# Patient Record
Sex: MALE | Race: WHITE | Employment: FULL TIME | URBAN - METROPOLITAN AREA
[De-identification: names, ages, dates, MRNs, and addresses within clinical notes are randomized per-mention and may not be internally consistent; named-entity substitution may affect disease eponyms.]

---

## 2021-06-01 ENCOUNTER — TELEPHONE (OUTPATIENT)
Dept: FAMILY MEDICINE CLINIC | Age: 51
End: 2021-06-01

## 2021-06-01 NOTE — TELEPHONE ENCOUNTER
Patient used to be established with you/ last seen in 2013. He is wondering if you'll accept him back into your practice, he just wants to get a physical. Or should I give him the number to Dr. Nik Ho?

## 2021-06-09 ENCOUNTER — TELEPHONE (OUTPATIENT)
Dept: FAMILY MEDICINE CLINIC | Age: 51
End: 2021-06-09

## 2021-06-09 NOTE — TELEPHONE ENCOUNTER
Patient was scheduled today for an appointment. He did not stay for the appointment bc he had several symptoms. He was asked to have a COVID test by provider in which was negative. He is now calling for a virtual appointment. This would be his new patient appointment and acute. After speaking to you, I scheduled appointment for this Friday at 4:00. Dr. Prem Vega would you like him to see PA tomorrow for acute, so he does not have to wait, then schedule separate appointment for new patient?

## 2021-06-10 ENCOUNTER — HOSPITAL ENCOUNTER (EMERGENCY)
Age: 51
Discharge: HOME | End: 2021-06-10
Payer: COMMERCIAL

## 2021-06-10 VITALS
TEMPERATURE: 98.7 F | DIASTOLIC BLOOD PRESSURE: 94 MMHG | SYSTOLIC BLOOD PRESSURE: 115 MMHG | RESPIRATION RATE: 20 BRPM | OXYGEN SATURATION: 100 % | BODY MASS INDEX: 26.31 KG/M2 | WEIGHT: 205 LBS | HEART RATE: 86 BPM | HEIGHT: 74 IN

## 2021-06-10 DIAGNOSIS — R51.9 ACUTE NONINTRACTABLE HEADACHE, UNSPECIFIED HEADACHE TYPE: ICD-10-CM

## 2021-06-10 DIAGNOSIS — R52 BODY ACHES: ICD-10-CM
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
## 2021-06-10 PROCEDURE — 6370000000 HC RX 637 (ALT 250 FOR IP): Performed by: PHYSICIAN ASSISTANT

## 2021-06-10 PROCEDURE — 99284 EMERGENCY DEPT VISIT MOD MDM: CPT

## 2021-06-10 RX ORDER — DOXYCYCLINE HYCLATE 100 MG
100 TABLET ORAL ONCE
Status: COMPLETED | OUTPATIENT
Start: 2021-06-10 | End: 2021-06-10

## 2021-06-10 RX ORDER — DOXYCYCLINE HYCLATE 100 MG
100 TABLET ORAL 2 TIMES DAILY
Qty: 28 TABLET | Refills: 0 | Status: SHIPPED | OUTPATIENT
Start: 2021-06-10 | End: 2021-06-24

## 2021-06-10 RX ADMIN — DOXYCYCLINE HYCLATE 100 MG: 100 TABLET, COATED ORAL at 14:59

## 2021-06-10 ASSESSMENT — PAIN SCALES - GENERAL
PAINLEVEL_OUTOF10: 5
PAINLEVEL_OUTOF10: 5
PAINLEVEL_OUTOF10: 6
PAINLEVEL_OUTOF10: 4

## 2021-06-10 ASSESSMENT — PAIN DESCRIPTION - LOCATION
LOCATION: HEAD;NECK
LOCATION: HEAD
LOCATION: HEAD;GENERALIZED
LOCATION: NECK;HEAD

## 2021-06-10 ASSESSMENT — PAIN DESCRIPTION - DESCRIPTORS: DESCRIPTORS: HEADACHE

## 2021-06-10 ASSESSMENT — ENCOUNTER SYMPTOMS
COUGH: 0
VOMITING: 0
NAUSEA: 0
ABDOMINAL PAIN: 0
BACK PAIN: 0
SHORTNESS OF BREATH: 0
EYES NEGATIVE: 1

## 2021-06-10 ASSESSMENT — PAIN DESCRIPTION - PAIN TYPE: TYPE: ACUTE PAIN

## 2021-06-10 NOTE — TELEPHONE ENCOUNTER
I spoke with the pt. He states that he went to [facility] on [facility] He states that he was running a fever of 102 last night. He was talking to a friend that had Lyme Disease and that person told him that he might have it as well. Pt states that he is going to go back to this urgent care to be tested for Lyme Disease today. I asked him if he wanted to cancel his appt for tomorrow and he stated to just leave it and he will call in the morning if he wants to cancel or not.

## 2021-06-10 NOTE — ED PROVIDER NOTES
axilla:            DIAGNOSTICRESULTS:     Labs:  Lyme disease acute reflexive panel ordered per recommendation by infectious disease      PROCEDURES:   N/A    CRITICAL CARE TIME:       None    CONSULTS:  IP CONSULT TO INFECTIOUS DISEASES      EMERGENCY DEPARTMENT COURSE and DIFFERENTIAL DIAGNOSIS/MDM:   Vitals:    Vitals:    06/10/21 1300 06/10/21 1334 06/10/21 1440 06/10/21 1600   BP: 138/73 123/85 114/80 (!) 115/94   Pulse:  90 84 86   Resp:  20 20 20   Temp:       TempSrc:       SpO2:  99% 100% 100%   Weight:       Height:           Patient was given the following medications:  Medications   doxycycline hyclate (VIBRA-TABS) tablet 100 mg (100 mg Oral Given 6/10/21 1459)       I have evaluated this patient in the ED. Old records were reviewed. Patient arrives to the ED with a rash to his left forearm concerned about possible Lyme disease. He has a history that is concerning for this given his time out in the woods, working outside and history of finding ticks on himself in the past.  His rash is not a typical bull's-eye rash. However describes symptoms that would be consistent with Lyme disease. I did consult infectious disease just to get recommendations for any testing they would not need to do an antibiotic course/length. Dr. Yael Wiseman did recommend a Lyme disease acute reflexive panel despite the fact that results could be initially negative as it is early in the course of the suspected infection/illness. He also recommended the doxycycline course to be 14 days. Patient started on this. He is given return precautions and instructions on discharge. I estimate there is LOW risk for SEPSIS, STREP, EPIGLOTTITIS, PNEUMONIA, INFLUENZA, MENINGITIS, OR URINARY TRACT INFECTION, thus I consider the discharge disposition reasonable. Also, there is no evidence or peritonitis, sepsis, or toxicity.  Tamara Robison and I have discussed the diagnosis and risks, and we agree with discharging home to follow-up with their primary doctor. We also discussed returning to the Emergency Department immediately if new or worsening symptoms occur. We have discussed the symptoms which are most concerning (e.g., changing or worsening pain, trouble swallowing or breathing, neck stiffness, worsening fever) that necessitate immediate return. FINAL IMPRESSION:      1. Tick bite, initial encounter    2. Body aches    3.  Acute nonintractable headache, unspecified headache type          DISPOSITION/PLAN:   DISPOSITION Decision To Discharge      PATIENT REFERRED TO:  Yvrose Julien MD  [address] [facility] [address]  [phone]    Schedule an appointment as soon as possible for a visit       Saundra Traylor MD  [address] [address]  [phone]    Schedule an appointment as soon as possible for a visit         DISCHARGE MEDICATIONS:  Discharge Medication List as of 6/10/2021  2:55 PM      START taking these medications    Details   doxycycline hyclate (VIBRA-TABS) 100 MG tablet Take 1 tablet by mouth 2 times daily for 14 days, Disp-28 tablet, R-0Normal                        (Please note thatportions of this note were completed with a voice recognition program.  Efforts were made to edit the dictations, but occasionally words are mis-transcribed.)    Isai Trejo PA-C (electronicallysigned)              Attila Mosquera  06/10/21 0025

## 2021-06-10 NOTE — TELEPHONE ENCOUNTER
I called [facility] and spoke with Reyna Patiño. She states that he was there yesterday and did the rapid test. She is going to fax us the results to the nurse's station fax number so that I can scan the results as soon as I get them.

## 2021-06-10 NOTE — TELEPHONE ENCOUNTER
Dr. Elizabeth Blackwood, please see previous encounters. Things have changed a little. Please advise whether you want to keep as a DOXY/Virtual or in office. Need to confirm with patient when decided so he will know to receive a call or come into office.

## 2021-06-23 ENCOUNTER — TELEPHONE (OUTPATIENT)
Dept: INFECTIOUS DISEASES | Age: 51
End: 2021-06-23

## 2021-06-30 ENCOUNTER — HOSPITAL ENCOUNTER (OUTPATIENT)
Age: 51
Discharge: HOME | End: 2021-06-30
Payer: COMMERCIAL

## 2021-06-30 ENCOUNTER — OFFICE VISIT (OUTPATIENT)
Dept: FAMILY MEDICINE CLINIC | Age: 51
End: 2021-06-30
Payer: COMMERCIAL

## 2021-06-30 VITALS
OXYGEN SATURATION: 99 % | DIASTOLIC BLOOD PRESSURE: 60 MMHG | SYSTOLIC BLOOD PRESSURE: 90 MMHG | BODY MASS INDEX: 25.93 KG/M2 | WEIGHT: 202 LBS | HEART RATE: 77 BPM | HEIGHT: 74 IN
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
## 2021-06-30 DIAGNOSIS — Z00.00 ROUTINE GENERAL MEDICAL EXAMINATION AT A HEALTH CARE FACILITY: Primary | ICD-10-CM

## 2021-06-30 PROCEDURE — 99386 PREV VISIT NEW AGE 40-64: CPT | Performed by: FAMILY MEDICINE

## 2021-06-30 PROCEDURE — 86618 LYME DISEASE ANTIBODY: CPT

## 2021-06-30 PROCEDURE — 86617 LYME DISEASE ANTIBODY: CPT

## 2021-06-30 ASSESSMENT — PATIENT HEALTH QUESTIONNAIRE - PHQ9
1. LITTLE INTEREST OR PLEASURE IN DOING THINGS: 0
SUM OF ALL RESPONSES TO PHQ QUESTIONS 1-9: 0
SUM OF ALL RESPONSES TO PHQ9 QUESTIONS 1 & 2: 0
SUM OF ALL RESPONSES TO PHQ QUESTIONS 1-9: 0
2. FEELING DOWN, DEPRESSED OR HOPELESS: 0
SUM OF ALL RESPONSES TO PHQ QUESTIONS 1-9: 0
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
## 2021-07-02 LAB — LYME, EIA: 3.18 LIV (ref 0–1.2)

## 2021-07-05 LAB
LYME (B. BURGDORFERI) AB IGG WB: NEGATIVE
LYME AB IGM BY WB:: POSITIVE

## 2021-07-12 ENCOUNTER — NURSE ONLY (OUTPATIENT)
Dept: FAMILY MEDICINE CLINIC | Age: 51
End: 2021-07-12
Payer: COMMERCIAL

## 2021-07-12 DIAGNOSIS — Z00.00 ROUTINE GENERAL MEDICAL EXAMINATION AT A HEALTH CARE FACILITY: ICD-10-CM

## 2021-07-12 LAB
A/G RATIO: 1.6 (ref 1.1–2.2)
ALBUMIN SERPL-MCNC: 4.6 G/DL (ref 3.4–5)
ALP BLD-CCNC: 95 U/L (ref 40–129)
ALT SERPL-CCNC: 24 U/L (ref 10–40)
ANION GAP SERPL CALCULATED.3IONS-SCNC: 9 MMOL/L (ref 3–16)
AST SERPL-CCNC: 21 U/L (ref 15–37)
BILIRUB SERPL-MCNC: 0.5 MG/DL (ref 0–1)
BUN BLDV-MCNC: 14 MG/DL (ref 7–20)
CALCIUM SERPL-MCNC: 9.5 MG/DL (ref 8.3–10.6)
CHLORIDE BLD-SCNC: 108 MMOL/L (ref 99–110)
CHOLESTEROL, TOTAL: 169 MG/DL (ref 0–199)
CO2: 28 MMOL/L (ref 21–32)
CREAT SERPL-MCNC: 1.1 MG/DL (ref 0.9–1.3)
GFR AFRICAN AMERICAN: >60
GFR NON-AFRICAN AMERICAN: >60
GLOBULIN: 2.9 G/DL
GLUCOSE BLD-MCNC: 110 MG/DL (ref 70–99)
HDLC SERPL-MCNC: 45 MG/DL (ref 40–60)
HEPATITIS C ANTIBODY INTERPRETATION: NORMAL
LDL CHOLESTEROL CALCULATED: 104 MG/DL
POTASSIUM SERPL-SCNC: 5.2 MMOL/L (ref 3.5–5.1)
SODIUM BLD-SCNC: 145 MMOL/L (ref 136–145)
TOTAL PROTEIN: 7.5 G/DL (ref 6.4–8.2)
TRIGL SERPL-MCNC: 102 MG/DL (ref 0–150)
VLDLC SERPL CALC-MCNC: 20 MG/DL

## 2021-07-12 PROCEDURE — 36415 COLL VENOUS BLD VENIPUNCTURE: CPT | Performed by: FAMILY MEDICINE

## 2021-07-13 ENCOUNTER — TELEPHONE (OUTPATIENT)
Dept: INFECTIOUS DISEASES | Age: 51
End: 2021-07-13

## 2021-07-13 LAB
ESTIMATED AVERAGE GLUCOSE: 93.9 MG/DL
HBA1C MFR BLD: 4.9 %
HIV AG/AB: NORMAL
HIV ANTIGEN: NORMAL
HIV-1 ANTIBODY: NORMAL
HIV-2 AB: NORMAL

## 2021-07-13 NOTE — TELEPHONE ENCOUNTER
Looks like he has already been treated w 2 week course doxy  No need for ID evaluation unless there are ongoing concerns   Thanks

## 2021-07-13 NOTE — TELEPHONE ENCOUNTER
Patient tested positive for Lyme disease: Per dr. Cobb Sink note on 6/23/21:    Can we call and schedule patient sooner than in August ?

## 2021-07-13 NOTE — TELEPHONE ENCOUNTER
Patient advised, will call his PCP and let them know about Dr. Medina Quick advise.  No ongoing concerns per patient at [facility]

## 2021-08-13 ENCOUNTER — OFFICE VISIT (OUTPATIENT)
Dept: FAMILY MEDICINE CLINIC | Age: 51
End: 2021-08-13
Payer: COMMERCIAL

## 2021-08-13 VITALS
SYSTOLIC BLOOD PRESSURE: 118 MMHG | OXYGEN SATURATION: 97 % | BODY MASS INDEX: 26.15 KG/M2 | WEIGHT: 203.8 LBS | DIASTOLIC BLOOD PRESSURE: 76 MMHG | HEART RATE: 72 BPM | HEIGHT: 74 IN

## 2021-08-13 DIAGNOSIS — A69.20 LYME DISEASE: Primary | ICD-10-CM

## 2021-08-13 PROCEDURE — 99213 OFFICE O/P EST LOW 20 MIN: CPT | Performed by: PHYSICIAN ASSISTANT

## 2021-08-13 ASSESSMENT — ENCOUNTER SYMPTOMS: RESPIRATORY NEGATIVE: 1

## 2021-08-13 NOTE — PROGRESS NOTES
Subjective:      Patient ID: Sharona Burt is a 45 y.o. male. HPI  Patient presents with left arm pain for the last 4-6 weeks. Was bitten by a tic in June had fevers fatigue rash, headaches, was placed on doxy for 2 weeks, those symptoms resolved but then this started. The pain moves from the shoulder, elbow and right thumb joint. Review of Systems   Constitutional: Negative. Respiratory: Negative. Cardiovascular: Negative. Musculoskeletal:        Left shoulder, elbow and thumb pain       Objective:   Physical Exam  Constitutional:       Appearance: Normal appearance. Cardiovascular:      Rate and Rhythm: Normal rate and regular rhythm. Heart sounds: Normal heart sounds. Pulmonary:      Effort: Pulmonary effort is normal.      Breath sounds: Normal breath sounds. Musculoskeletal:      Left shoulder: Tenderness present. Decreased range of motion. Arms:       Comments: Full rom elbow and thumb joint, no ttp, no edema    Skin:     General: Skin is warm and dry. Neurological:      Mental Status: He is alert. Assessment / Plan:       Diagnosis Orders   1.  Lyme disease  Odette Fernandez MD, Infectious Disease, [facility]      Referral to ID for eval of migratory joint pains

## 2021-09-14 ENCOUNTER — OFFICE VISIT (OUTPATIENT)
Dept: INFECTIOUS DISEASES | Age: 51
End: 2021-09-14
Payer: COMMERCIAL

## 2021-09-14 VITALS
DIASTOLIC BLOOD PRESSURE: 82 MMHG | SYSTOLIC BLOOD PRESSURE: 116 MMHG | TEMPERATURE: 97.9 F | WEIGHT: 208 LBS | HEIGHT: 74 IN | BODY MASS INDEX: 26.69 KG/M2

## 2021-09-14 DIAGNOSIS — A69.23: Primary | ICD-10-CM

## 2021-09-14 PROCEDURE — 93000 ELECTROCARDIOGRAM COMPLETE: CPT | Performed by: INTERNAL MEDICINE

## 2021-09-14 PROCEDURE — 99243 OFF/OP CNSLTJ NEW/EST LOW 30: CPT | Performed by: INTERNAL MEDICINE

## 2021-09-14 RX ORDER — DOXYCYCLINE HYCLATE 100 MG
100 TABLET ORAL 2 TIMES DAILY
Qty: 56 TABLET | Refills: 0 | Status: SHIPPED | OUTPATIENT
Start: 2021-09-14 | End: 2021-10-12
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
## 2021-10-01 ENCOUNTER — TELEPHONE (OUTPATIENT)
Dept: INFECTIOUS DISEASES | Age: 51
End: 2021-10-01

## 2021-10-01 NOTE — TELEPHONE ENCOUNTER
Patient stated that he had received message from you regarding EKG and possibly seeing PCP. He is asking if you can return his phone call.   Thank you

## 2021-10-01 NOTE — TELEPHONE ENCOUNTER
Returned the call  He will speak with PCP to decide if repeat EKG vs Cards referral necessary   He continues to feel well/better

## 2021-10-13 ENCOUNTER — OFFICE VISIT (OUTPATIENT)
Dept: FAMILY MEDICINE CLINIC | Age: 51
End: 2021-10-13
Payer: COMMERCIAL

## 2021-10-13 VITALS
HEIGHT: 74 IN | OXYGEN SATURATION: 98 % | HEART RATE: 61 BPM | DIASTOLIC BLOOD PRESSURE: 72 MMHG | SYSTOLIC BLOOD PRESSURE: 100 MMHG | WEIGHT: 206.6 LBS | BODY MASS INDEX: 26.51 KG/M2

## 2021-10-13 DIAGNOSIS — R42 DIZZINESS: ICD-10-CM

## 2021-10-13 DIAGNOSIS — A69.20 LYME DISEASE: Primary | ICD-10-CM

## 2021-10-13 PROCEDURE — 99214 OFFICE O/P EST MOD 30 MIN: CPT | Performed by: FAMILY MEDICINE

## 2021-10-13 PROCEDURE — 93000 ELECTROCARDIOGRAM COMPLETE: CPT | Performed by: FAMILY MEDICINE

## 2021-10-13 NOTE — PROGRESS NOTES
Patient:  Mindy blanton 45 y.o. Angel Cuenca presents today with the following Chief Complaint(s):  Chief Complaint   Patient presents with    Abnormal Test Results     pt had an EKG ordered by Dr. Dennise Grajeda that was abnormal, she wanted pt to come here to see his PCP to see if he needed another EKG or if he should get a referral to cardiology       Patient is here today to follow-up on an abnormal EKG that he had while he was at Dr. Priscilla Cary office. He is being treated for Lyme's disease. He was describing some vague dizziness symptoms that only occurred when he is in big box stores or grocery stores. He has fleeting sensation of being off balance. Strongly active playing tennis and hiking. He denies any chest pain tightness pressure heaviness squeezing palpitations no syncope or near syncope. Did not have a prior EKG for comparison. His EKG at Dr. Priscilla Cary office showed sinus bradycardia with age-indeterminate anterior infarct. Patient had a recent physical, lipids were fine, no diabetes no hypertension no family history of heart disease. Non-smoker        No current outpatient medications on file. No current facility-administered medications for this visit. Patients past medical history, surgical history, family history, medications and allergies were all reviewed and updated as appropriate today. Review of Systems   Cardiovascular: Negative for chest pain and palpitations. Neurological: Negative for syncope, facial asymmetry and light-headedness. Physical Exam  Vitals reviewed. Cardiovascular:      Rate and Rhythm: Normal rate and regular rhythm. Heart sounds: Normal heart sounds. Pulmonary:      Breath sounds: Normal breath sounds. No wheezing or rales. Neurological:      Mental Status: He is alert.            Vitals:    10/13/21 1555   BP: 100/72   Pulse: 61   SpO2: 98%   Weight: 206 lb 9.6 oz (93.7 kg)   Height: 6' 2\" (1.88 m)       Assessment/Plan:   Jarrell Bolivar was seen today for abnormal test results. Diagnoses and all orders for this visit:    Lyme disease    Dizziness  -     EKG 12 Lead      Repeat EKG shows sinus bradycardia with a heart rate of 58, RS R pattern, unremarkable. Repeat EKG looking fine, no need for further cardiac testing. Patient was certainly involved in this decision making. He is very comfortable with this decision. If he has any future heart related symptoms he will let us know. He will continue to follow-up with Dr. Cloyde Gaucher regarding Lyme disease.     I spent a total of 30* minutes with the patient, reviewing previous notes, consults, test results, imaging ,discussing chronic and acute conditions

## 2021-10-28 ENCOUNTER — TELEPHONE (OUTPATIENT)
Dept: FAMILY MEDICINE CLINIC | Age: 51
End: 2021-10-28

## 2021-10-28 DIAGNOSIS — Z12.11 SCREEN FOR COLON CANCER: Primary | ICD-10-CM

## 2021-10-28 NOTE — TELEPHONE ENCOUNTER
Called patient, he said he called [facility] because that is closest to him. This is for a colonoscopy.

## 2021-10-28 NOTE — TELEPHONE ENCOUNTER
Aurelia Surya  Patient HM Schedule Request Pool 3 hours ago (12:33 PM)   MAIDA  Appointment Request From: Bipin Terrazas     With Provider: Santo Kennedy MD [-Primary Care Physician-]     Preferred Date Range: Any date 10/28/2021 or later     Preferred Times: Any     Reason: To address the following health maintenance concerns. Colon Cancer Screen Colonoscopy     Comments:  I tried to call a GI clinic directly.  They said they need a referral.

## 2023-04-26 ENCOUNTER — TELEMEDICINE (OUTPATIENT)
Dept: FAMILY MEDICINE CLINIC | Age: 53
End: 2023-04-26
Payer: COMMERCIAL

## 2023-04-26 DIAGNOSIS — H10.33 ACUTE CONJUNCTIVITIS OF BOTH EYES, UNSPECIFIED ACUTE CONJUNCTIVITIS TYPE: Primary | ICD-10-CM

## 2023-04-26 PROCEDURE — 99213 OFFICE O/P EST LOW 20 MIN: CPT | Performed by: PHYSICIAN ASSISTANT

## 2023-04-26 RX ORDER — MOXIFLOXACIN 5 MG/ML
1 SOLUTION/ DROPS OPHTHALMIC 3 TIMES DAILY
Qty: 1 EACH | Refills: 0 | Status: SHIPPED | OUTPATIENT
Start: 2023-04-26 | End: 2023-05-03

## 2023-04-26 RX ORDER — OLOPATADINE HYDROCHLORIDE 1 MG/ML
1 SOLUTION/ DROPS OPHTHALMIC 2 TIMES DAILY
Qty: 1 EACH | Refills: 3 | Status: SHIPPED | OUTPATIENT
Start: 2023-04-26 | End: 2023-05-26

## 2023-04-26 ASSESSMENT — PATIENT HEALTH QUESTIONNAIRE - PHQ9
SUM OF ALL RESPONSES TO PHQ9 QUESTIONS 1 & 2: 0
5. POOR APPETITE OR OVEREATING: 0
10. IF YOU CHECKED OFF ANY PROBLEMS, HOW DIFFICULT HAVE THESE PROBLEMS MADE IT FOR YOU TO DO YOUR WORK, TAKE CARE OF THINGS AT HOME, OR GET ALONG WITH OTHER PEOPLE: 0
2. FEELING DOWN, DEPRESSED OR HOPELESS: 0
3. TROUBLE FALLING OR STAYING ASLEEP: 0
4. FEELING TIRED OR HAVING LITTLE ENERGY: 0
6. FEELING BAD ABOUT YOURSELF - OR THAT YOU ARE A FAILURE OR HAVE LET YOURSELF OR YOUR FAMILY DOWN: 0
SUM OF ALL RESPONSES TO PHQ QUESTIONS 1-9: 0
SUM OF ALL RESPONSES TO PHQ QUESTIONS 1-9: 0
1. LITTLE INTEREST OR PLEASURE IN DOING THINGS: 0
7. TROUBLE CONCENTRATING ON THINGS, SUCH AS READING THE NEWSPAPER OR WATCHING TELEVISION: 0
9. THOUGHTS THAT YOU WOULD BE BETTER OFF DEAD, OR OF HURTING YOURSELF: 0
SUM OF ALL RESPONSES TO PHQ QUESTIONS 1-9: 0
SUM OF ALL RESPONSES TO PHQ QUESTIONS 1-9: 0
8. MOVING OR SPEAKING SO SLOWLY THAT OTHER PEOPLE COULD HAVE NOTICED. OR THE OPPOSITE, BEING SO FIGETY OR RESTLESS THAT YOU HAVE BEEN MOVING AROUND A LOT MORE THAN USUAL: 0

## 2023-04-26 NOTE — PROGRESS NOTES
Yes  Total time spent on this encounter: Not billed by time  Services were provided through a video synchronous discussion virtually to substitute for in-person clinic visit. Patient and provider were located at their individual homes. --BERTA Aquino on 4/26/2023 at 4:09 PM    An electronic signature was used to authenticate this note.

## 2024-02-03 NOTE — TELEPHONE ENCOUNTER
Called [facility] and spoke to Yolanda Howard  She states that this lab is not showing up on the \"softside \" which means it was   Never collected or the specimen was not sent / or lost
I think the test should have resulted by now  Can you please call the lab and see what is the status of the test
JIMENAM on patient's phone to call us back to advise
Noted     I think he should see his PCP and get a test for lyme if there was a significant exposure     If he tests positive or still having unexplained fever I would be happy to see him promptly     Please let him know
Patient was in ED on 6/10/21. On discharge document : To schedule as soon as possible with ID Doctor. Patient lives in [facility] area. Asking to make appointment with dr. Rohini Mcmillan   Lyme Disease Acute Reflexive Panel test done during the ED Visit  In person or VV visit, patient is ok with either.    Please advise
Time-based billing (NON-critical care)

## 2024-07-23 ENCOUNTER — OFFICE VISIT (OUTPATIENT)
Dept: FAMILY MEDICINE CLINIC | Age: 54
End: 2024-07-23
Payer: COMMERCIAL

## 2024-07-23 VITALS
BODY MASS INDEX: 23.9 KG/M2 | SYSTOLIC BLOOD PRESSURE: 122 MMHG | WEIGHT: 186.2 LBS | DIASTOLIC BLOOD PRESSURE: 64 MMHG | OXYGEN SATURATION: 98 % | HEIGHT: 74 IN | HEART RATE: 81 BPM

## 2024-07-23 DIAGNOSIS — Z86.19 HISTORY OF LYME DISEASE: ICD-10-CM

## 2024-07-23 DIAGNOSIS — M54.10 RADICULAR LEG PAIN: ICD-10-CM

## 2024-07-23 DIAGNOSIS — M25.521 ARTHRALGIA OF RIGHT ELBOW: Primary | ICD-10-CM

## 2024-07-23 PROCEDURE — 99214 OFFICE O/P EST MOD 30 MIN: CPT | Performed by: FAMILY MEDICINE

## 2024-07-23 NOTE — PROGRESS NOTES
Patient:  Silas Kramer a 54 y.o. malewho presents today with the following Chief Complaint(s):  Chief Complaint   Patient presents with    Back Pain     Pt states that he has been having worsening back pain. He states that he always feels a lower back pain that goes into his left leg, sometimes there is numbness. He think maybe there is a disc problem.    Circulatory Problem     Pt states that his left arm will fall asleep while bent for too long. He states that he has been having a lot of dizziness and almost passed out a few times. He states that he had lyme disease 3 years ago and is wanting to know if all of this could be from that.       Patient was last seen by me about 3 years ago.  At that time he had Lyme disease and was seen by infectious disease.  Patient returns today with multiple issues.  He says for about 20 years he has had occasions of radicular pain down his left leg.  He said that years ago he was told that he had a herniated disc.  This pain continues to happen intermittently.  He says it last for several hours and then it goes away.  It is not severe enough for him to take any medication.  When he had his Lyme disease infection, he had arthralgia of his left shoulder and elbow and wrist.  Now he is having pain in his right elbow that feels very similar to the pains that he had when he had active Lyme disease.  He denies any fall or trauma or injury to his right elbow he is right-hand dominant.  Again the pain is not severe enough for him to warrant taking any medication.  His main concern is if there is any underlying Lyme disease reactivation that could be causing this.  Patient also for his whole life is had issues with orthostasis.  When he is squatting down or sitting especially in the heat and stands up quickly he feels dizzy and lightheaded.  He denies any syncope he denies any chest pain palpitations irregular heartbeat.  Patient runs and cycles and does not have any similar pain

## 2024-07-26 ENCOUNTER — TELEPHONE (OUTPATIENT)
Dept: INFECTIOUS DISEASES | Age: 54
End: 2024-07-26

## 2024-07-26 NOTE — TELEPHONE ENCOUNTER
Called patient to schedule FOLLOW UP appointment with Dr. Reaves    [facility]

## 2024-07-26 NOTE — TELEPHONE ENCOUNTER
----- Message from Kiesha Gilbert RN sent at 7/26/2024 10:06 AM EDT -----  Regarding: RE: referral  Has seen Dr Reaves in 2021 for same dx. Please schedule him as a follow up.    Thanks!    ----- Message -----  From: Reyna Nichols MA  Sent: 7/24/2024   7:46 AM EDT  To: Kiesha Gilbert RN  Subject: referral                                         Received internal referral 7/24/24  Dx: Hx of Lyme Disease  Referred by: Dr. Cindy Xiong MD  Referral and Records in epic

## 2024-08-12 ENCOUNTER — OFFICE VISIT (OUTPATIENT)
Dept: FAMILY MEDICINE CLINIC | Age: 54
End: 2024-08-12
Payer: COMMERCIAL

## 2024-08-12 VITALS
WEIGHT: 182.2 LBS | HEIGHT: 74 IN | BODY MASS INDEX: 23.38 KG/M2 | HEART RATE: 65 BPM | DIASTOLIC BLOOD PRESSURE: 64 MMHG | OXYGEN SATURATION: 99 % | SYSTOLIC BLOOD PRESSURE: 110 MMHG

## 2024-08-12 DIAGNOSIS — Z00.00 ROUTINE GENERAL MEDICAL EXAMINATION AT A HEALTH CARE FACILITY: Primary | ICD-10-CM

## 2024-08-12 DIAGNOSIS — Z23 NEED FOR DIPHTHERIA-TETANUS-PERTUSSIS (TDAP) VACCINE: ICD-10-CM

## 2024-08-12 LAB
ALBUMIN SERPL-MCNC: 4.7 G/DL (ref 3.4–5)
ALBUMIN/GLOB SERPL: 1.8 {RATIO} (ref 1.1–2.2)
ALP SERPL-CCNC: 78 U/L (ref 40–129)
ALT SERPL-CCNC: 16 U/L (ref 10–40)
ANION GAP SERPL CALCULATED.3IONS-SCNC: 13 MMOL/L (ref 3–16)
AST SERPL-CCNC: 17 U/L (ref 15–37)
BASOPHILS # BLD: 0.1 K/UL (ref 0–0.2)
BASOPHILS NFR BLD: 1.6 %
BILIRUB SERPL-MCNC: 1.3 MG/DL (ref 0–1)
BUN SERPL-MCNC: 12 MG/DL (ref 7–20)
CALCIUM SERPL-MCNC: 9.6 MG/DL (ref 8.3–10.6)
CHLORIDE SERPL-SCNC: 104 MMOL/L (ref 99–110)
CHOLEST SERPL-MCNC: 161 MG/DL (ref 0–199)
CO2 SERPL-SCNC: 27 MMOL/L (ref 21–32)
CREAT SERPL-MCNC: 0.9 MG/DL (ref 0.9–1.3)
DEPRECATED RDW RBC AUTO: 12.8 % (ref 12.4–15.4)
EOSINOPHIL # BLD: 0.1 K/UL (ref 0–0.6)
EOSINOPHIL NFR BLD: 2.1 %
EST. AVERAGE GLUCOSE BLD GHB EST-MCNC: 85.3 MG/DL
GFR SERPLBLD CREATININE-BSD FMLA CKD-EPI: >90 ML/MIN/{1.73_M2}
GLUCOSE SERPL-MCNC: 88 MG/DL (ref 70–99)
HBA1C MFR BLD: 4.6 %
HCT VFR BLD AUTO: 49 % (ref 40.5–52.5)
HDLC SERPL-MCNC: 46 MG/DL (ref 40–60)
HGB BLD-MCNC: 16.7 G/DL (ref 13.5–17.5)
LDLC SERPL CALC-MCNC: 97 MG/DL
LYMPHOCYTES # BLD: 1.3 K/UL (ref 1–5.1)
LYMPHOCYTES NFR BLD: 28.6 %
MCH RBC QN AUTO: 31.7 PG (ref 26–34)
MCHC RBC AUTO-ENTMCNC: 34 G/DL (ref 31–36)
MCV RBC AUTO: 93.3 FL (ref 80–100)
MONOCYTES # BLD: 0.4 K/UL (ref 0–1.3)
MONOCYTES NFR BLD: 8.1 %
NEUTROPHILS # BLD: 2.7 K/UL (ref 1.7–7.7)
NEUTROPHILS NFR BLD: 59.6 %
PLATELET # BLD AUTO: 174 K/UL (ref 135–450)
PMV BLD AUTO: 10 FL (ref 5–10.5)
POTASSIUM SERPL-SCNC: 5.2 MMOL/L (ref 3.5–5.1)
PROT SERPL-MCNC: 7.3 G/DL (ref 6.4–8.2)
PSA SERPL DL<=0.01 NG/ML-MCNC: 2.46 NG/ML (ref 0–4)
RBC # BLD AUTO: 5.25 M/UL (ref 4.2–5.9)
SODIUM SERPL-SCNC: 144 MMOL/L (ref 136–145)
TRIGL SERPL-MCNC: 90 MG/DL (ref 0–150)
VLDLC SERPL CALC-MCNC: 18 MG/DL
WBC # BLD AUTO: 4.6 K/UL (ref 4–11)

## 2024-08-12 PROCEDURE — 36415 COLL VENOUS BLD VENIPUNCTURE: CPT | Performed by: FAMILY MEDICINE

## 2024-08-12 PROCEDURE — 90471 IMMUNIZATION ADMIN: CPT | Performed by: FAMILY MEDICINE

## 2024-08-12 PROCEDURE — 90715 TDAP VACCINE 7 YRS/> IM: CPT | Performed by: FAMILY MEDICINE

## 2024-08-12 PROCEDURE — 99396 PREV VISIT EST AGE 40-64: CPT | Performed by: FAMILY MEDICINE

## 2024-08-12 NOTE — PROGRESS NOTES
History and Physical      Silas Albarado  YOB: 1970    Date of Service:  8/12/2024    Chief Complaint:   Silas Albarado is a 54 y.o. male who presents for complete physical examination.    HPI: Patient here for an annual wellness visit, no acute issues    Wt Readings from Last 3 Encounters:   08/12/24 82.6 kg (182 lb 3.2 oz)   07/23/24 84.5 kg (186 lb 3.2 oz)   10/13/21 93.7 kg (206 lb 9.6 oz)     BP Readings from Last 3 Encounters:   08/12/24 110/64   07/23/24 122/64   10/13/21 100/72       Patient Active Problem List   Diagnosis    Acute appendicitis    Lyme disease       Preventive Care:  Health Maintenance   Topic Date Due    Hepatitis B vaccine (1 of 3 - 19+ 3-dose series) Never done    Shingles vaccine (1 of 2) Never done    Flu vaccine (1) Never done    Colorectal Cancer Screen  11/22/2024    Depression Screen  07/23/2025    Lipids  07/12/2026    DTaP/Tdap/Td vaccine (3 - Td or Tdap) 08/12/2034    COVID-19 Vaccine  Completed    Hepatitis C screen  Completed    HIV screen  Completed    Hepatitis A vaccine  Aged Out    Hib vaccine  Aged Out    Polio vaccine  Aged Out    Meningococcal (ACWY) vaccine  Aged Out    Pneumococcal 0-64 years Vaccine  Aged Out    Diabetes screen  Discontinued     Dentist every 6  Last eye exam: Every 2 years, normal  Exercise: yes    Lipid panel:    Lab Results   Component Value Date/Time    TRIG 102 07/12/2021 08:08 AM    HDL 45 07/12/2021 08:08 AM         Immunization History   Administered Date(s) Administered    COVID-19, MODERNA BLUE border, Primary or Immunocompromised, (age 12y+), IM, 100 mcg/0.5mL 03/17/2021, 04/07/2021    COVID-19, MODERNA, (2023-24 formula), (age 12y+), IM, 50mcg/0.5mL 09/22/2023    COVID-19, PFIZER GRAY top, DO NOT Dilute, (age 12 y+), IM, 30 mcg/0.3 mL 07/15/2022    COVID-19, PFIZER PURPLE top, DILUTE for use, (age 12 y+), 30mcg/0.3mL 11/30/2021    TDaP, ADACEL (age 10y-64y), BOOSTRIX (age 10y+), IM, 0.5mL 10/11/2013, 08/12/2024    Td,

## 2024-08-20 ENCOUNTER — OFFICE VISIT (OUTPATIENT)
Dept: INFECTIOUS DISEASES | Age: 54
End: 2024-08-20
Payer: COMMERCIAL

## 2024-08-20 VITALS
DIASTOLIC BLOOD PRESSURE: 72 MMHG | SYSTOLIC BLOOD PRESSURE: 124 MMHG | BODY MASS INDEX: 23.87 KG/M2 | WEIGHT: 186 LBS | TEMPERATURE: 97.9 F | HEIGHT: 74 IN

## 2024-08-20 DIAGNOSIS — A69.20 LYME DISEASE: Primary | ICD-10-CM

## 2024-08-20 DIAGNOSIS — M25.521 RIGHT ELBOW PAIN: ICD-10-CM

## 2024-08-20 PROCEDURE — 99213 OFFICE O/P EST LOW 20 MIN: CPT | Performed by: INTERNAL MEDICINE
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
